# Patient Record
Sex: FEMALE | Race: WHITE
[De-identification: names, ages, dates, MRNs, and addresses within clinical notes are randomized per-mention and may not be internally consistent; named-entity substitution may affect disease eponyms.]

---

## 2017-05-10 ENCOUNTER — RX ONLY (RX ONLY)
Age: 42
End: 2017-05-10

## 2017-05-10 ENCOUNTER — OFFICE (OUTPATIENT)
Dept: URBAN - METROPOLITAN AREA CLINIC 89 | Facility: CLINIC | Age: 42
Setting detail: OPHTHALMOLOGY
End: 2017-05-10
Payer: COMMERCIAL

## 2017-05-10 DIAGNOSIS — H01.004: ICD-10-CM

## 2017-05-10 DIAGNOSIS — H01.001: ICD-10-CM

## 2017-05-10 DIAGNOSIS — H00.12: ICD-10-CM

## 2017-05-10 PROCEDURE — 92002 INTRM OPH EXAM NEW PATIENT: CPT | Performed by: OPHTHALMOLOGY

## 2017-05-10 ASSESSMENT — REFRACTION_MANIFEST
OS_VA3: 20/
OU_VA: 20/
OD_VA3: 20/
OS_VA1: 20/
OD_VA1: 20/
OS_VA1: 20/
OU_VA: 20/
OS_VA3: 20/
OS_VA2: 20/
OU_VA: 20/
OD_VA2: 20/
OS_VA2: 20/
OD_VA2: 20/
OS_VA3: 20/
OS_VA2: 20/
OD_VA3: 20/
OD_VA3: 20/
OD_VA2: 20/
OS_VA1: 20/

## 2017-05-10 ASSESSMENT — KERATOMETRY
OD_K2POWER_DIOPTERS: 45.25
OS_AXISANGLE_DEGREES: 080
OD_K1POWER_DIOPTERS: 44.50
OS_K1POWER_DIOPTERS: 44.25
OD_AXISANGLE_DEGREES: 081
OS_K2POWER_DIOPTERS: 44.75

## 2017-05-10 ASSESSMENT — REFRACTION_CURRENTRX
OS_OVR_VA: 20/
OS_OVR_VA: 20/
OD_OVR_VA: 20/
OS_OVR_VA: 20/

## 2017-05-10 ASSESSMENT — CONFRONTATIONAL VISUAL FIELD TEST (CVF)
OS_FINDINGS: FULL
OD_FINDINGS: FULL

## 2017-05-10 ASSESSMENT — LID EXAM ASSESSMENTS
OS_BLEPHARITIS: LLL LUL 2+
OD_BLEPHARITIS: RLL RUL 2+

## 2017-05-10 ASSESSMENT — REFRACTION_AUTOREFRACTION
OS_CYLINDER: -0.25
OS_SPHERE: -0.75
OD_AXIS: 0
OD_SPHERE: -0.75
OD_CYLINDER: SPHERE
OS_AXIS: 135

## 2017-05-10 ASSESSMENT — VISUAL ACUITY
OD_BCVA: 20/20
OS_BCVA: 20/20

## 2017-05-10 ASSESSMENT — SPHEQUIV_DERIVED: OS_SPHEQUIV: -0.875

## 2017-05-10 ASSESSMENT — AXIALLENGTH_DERIVED: OS_AL: 23.5655

## 2017-05-24 ENCOUNTER — OFFICE (OUTPATIENT)
Dept: URBAN - METROPOLITAN AREA CLINIC 89 | Facility: CLINIC | Age: 42
Setting detail: OPHTHALMOLOGY
End: 2017-05-24
Payer: COMMERCIAL

## 2017-05-24 ENCOUNTER — RX ONLY (RX ONLY)
Age: 42
End: 2017-05-24

## 2017-05-24 DIAGNOSIS — H01.004: ICD-10-CM

## 2017-05-24 DIAGNOSIS — H00.12: ICD-10-CM

## 2017-05-24 DIAGNOSIS — H01.001: ICD-10-CM

## 2017-05-24 PROCEDURE — 92012 INTRM OPH EXAM EST PATIENT: CPT | Performed by: OPHTHALMOLOGY

## 2017-05-24 ASSESSMENT — REFRACTION_AUTOREFRACTION
OS_CYLINDER: 0.00
OD_CYLINDER: -0.25
OS_AXIS: 0
OD_SPHERE: -0.50
OS_SPHERE: -0.25
OD_AXIS: 170

## 2017-05-24 ASSESSMENT — KERATOMETRY
OD_K1POWER_DIOPTERS: 44.25
OS_AXISANGLE_DEGREES: 90
OS_K1POWER_DIOPTERS: 44.25
OS_K2POWER_DIOPTERS: 45.00
OD_K2POWER_DIOPTERS: 45.00
OD_AXISANGLE_DEGREES: 82

## 2017-05-24 ASSESSMENT — REFRACTION_MANIFEST
OS_VA1: 20/
OS_VA3: 20/
OD_VA3: 20/
OD_VA2: 20/
OU_VA: 20/
OS_VA1: 20/
OS_VA2: 20/
OS_VA3: 20/
OD_VA1: 20/
OS_VA1: 20/
OU_VA: 20/
OS_VA2: 20/
OS_VA2: 20/
OS_VA3: 20/
OU_VA: 20/
OD_VA3: 20/
OD_VA1: 20/
OD_VA1: 20/
OD_VA2: 20/
OD_VA2: 20/
OD_VA3: 20/

## 2017-05-24 ASSESSMENT — LID EXAM ASSESSMENTS
OD_BLEPHARITIS: RLL RUL 2+
OS_BLEPHARITIS: LLL LUL 2+

## 2017-05-24 ASSESSMENT — VISUAL ACUITY
OD_BCVA: 20/20
OS_BCVA: 20/20

## 2017-05-24 ASSESSMENT — CONFRONTATIONAL VISUAL FIELD TEST (CVF)
OS_FINDINGS: FULL
OD_FINDINGS: FULL

## 2017-05-24 ASSESSMENT — REFRACTION_CURRENTRX
OD_OVR_VA: 20/
OD_OVR_VA: 20/
OS_OVR_VA: 20/
OD_OVR_VA: 20/

## 2017-05-24 ASSESSMENT — AXIALLENGTH_DERIVED
OS_AL: 23.2803
OD_AL: 23.4234

## 2017-05-24 ASSESSMENT — SPHEQUIV_DERIVED
OS_SPHEQUIV: -0.25
OD_SPHEQUIV: -0.625

## 2019-04-24 ENCOUNTER — OFFICE (OUTPATIENT)
Dept: URBAN - METROPOLITAN AREA CLINIC 102 | Facility: CLINIC | Age: 44
Setting detail: OPHTHALMOLOGY
End: 2019-04-24
Payer: COMMERCIAL

## 2019-04-24 DIAGNOSIS — H11.31: ICD-10-CM

## 2019-04-24 PROCEDURE — 92012 INTRM OPH EXAM EST PATIENT: CPT | Performed by: OPHTHALMOLOGY

## 2019-04-24 ASSESSMENT — KERATOMETRY
OS_K2POWER_DIOPTERS: 45.00
OS_AXISANGLE_DEGREES: 093
OS_K1POWER_DIOPTERS: 44.25
OD_AXISANGLE_DEGREES: 083
OD_K2POWER_DIOPTERS: 45.50
OD_K1POWER_DIOPTERS: 44.50

## 2019-04-24 ASSESSMENT — LID EXAM ASSESSMENTS
OD_BLEPHARITIS: RLL RUL 2+
OS_BLEPHARITIS: LLL LUL 2+

## 2019-04-24 ASSESSMENT — REFRACTION_CURRENTRX
OD_OVR_VA: 20/
OS_OVR_VA: 20/
OS_OVR_VA: 20/
OD_OVR_VA: 20/
OD_OVR_VA: 20/
OS_OVR_VA: 20/

## 2019-04-24 ASSESSMENT — VISUAL ACUITY
OS_BCVA: 20/20
OD_BCVA: 20/20

## 2019-04-24 ASSESSMENT — REFRACTION_AUTOREFRACTION
OD_AXIS: 158
OS_AXIS: 007
OD_CYLINDER: -0.25
OS_SPHERE: PLANO
OD_SPHERE: PLANO
OS_CYLINDER: -0.25

## 2019-04-24 ASSESSMENT — REFRACTION_MANIFEST
OD_VA1: 20/
OS_VA3: 20/
OS_VA2: 20/
OD_VA3: 20/
OD_VA2: 20/
OD_VA1: 20/
OU_VA: 20/
OU_VA: 20/
OD_VA2: 20/
OS_VA2: 20/
OS_VA1: 20/
OS_VA1: 20/
OD_VA3: 20/
OS_VA3: 20/

## 2019-04-24 ASSESSMENT — CONFRONTATIONAL VISUAL FIELD TEST (CVF)
OS_FINDINGS: FULL
OD_FINDINGS: FULL

## 2019-05-08 ENCOUNTER — OFFICE (OUTPATIENT)
Dept: URBAN - METROPOLITAN AREA CLINIC 102 | Facility: CLINIC | Age: 44
Setting detail: OPHTHALMOLOGY
End: 2019-05-08
Payer: COMMERCIAL

## 2019-05-08 DIAGNOSIS — H01.001: ICD-10-CM

## 2019-05-08 DIAGNOSIS — H16.223: ICD-10-CM

## 2019-05-08 DIAGNOSIS — H11.31: ICD-10-CM

## 2019-05-08 DIAGNOSIS — H01.005: ICD-10-CM

## 2019-05-08 DIAGNOSIS — H01.002: ICD-10-CM

## 2019-05-08 DIAGNOSIS — H01.004: ICD-10-CM

## 2019-05-08 DIAGNOSIS — H00.12: ICD-10-CM

## 2019-05-08 PROCEDURE — 92012 INTRM OPH EXAM EST PATIENT: CPT | Performed by: OPHTHALMOLOGY

## 2019-05-08 ASSESSMENT — REFRACTION_CURRENTRX
OS_OVR_VA: 20/
OD_OVR_VA: 20/
OD_OVR_VA: 20/
OS_OVR_VA: 20/
OD_OVR_VA: 20/
OS_OVR_VA: 20/

## 2019-05-08 ASSESSMENT — KERATOMETRY
OS_K2POWER_DIOPTERS: 47.00
OD_AXISANGLE_DEGREES: 86
OD_K2POWER_DIOPTERS: 45.00
OD_K1POWER_DIOPTERS: 44.50
OS_AXISANGLE_DEGREES: 80
OS_K1POWER_DIOPTERS: 45.75

## 2019-05-08 ASSESSMENT — REFRACTION_MANIFEST
OS_VA3: 20/
OD_VA1: 20/
OD_VA1: 20/
OD_VA2: 20/
OS_VA1: 20/
OS_VA2: 20/
OS_VA2: 20/
OS_VA3: 20/
OS_VA1: 20/
OD_VA3: 20/
OD_VA2: 20/
OU_VA: 20/
OU_VA: 20/
OD_VA3: 20/

## 2019-05-08 ASSESSMENT — LID EXAM ASSESSMENTS
OS_BLEPHARITIS: LLL LUL 2+
OD_BLEPHARITIS: RLL RUL 2+

## 2019-05-08 ASSESSMENT — DRY EYES - PHYSICIAN NOTES
OS_GENERALCOMMENTS: DRY
OD_GENERALCOMMENTS: DRY

## 2019-05-08 ASSESSMENT — REFRACTION_AUTOREFRACTION
OD_SPHERE: PLANO
OD_CYLINDER: SPH
OS_CYLINDER: -0.25
OS_AXIS: 030
OS_SPHERE: PLANO

## 2019-05-08 ASSESSMENT — CONFRONTATIONAL VISUAL FIELD TEST (CVF)
OS_FINDINGS: FULL
OD_FINDINGS: FULL

## 2019-05-08 ASSESSMENT — VISUAL ACUITY
OD_BCVA: 20/20
OS_BCVA: 20/20

## 2022-02-14 ENCOUNTER — RX ONLY (RX ONLY)
Age: 47
End: 2022-02-14

## 2022-02-14 ENCOUNTER — OFFICE (OUTPATIENT)
Dept: URBAN - METROPOLITAN AREA CLINIC 35 | Facility: CLINIC | Age: 47
Setting detail: OPHTHALMOLOGY
End: 2022-02-14
Payer: MEDICAID

## 2022-02-14 DIAGNOSIS — H11.153: ICD-10-CM

## 2022-02-14 DIAGNOSIS — H05.20: ICD-10-CM

## 2022-02-14 PROCEDURE — 92002 INTRM OPH EXAM NEW PATIENT: CPT | Performed by: OPHTHALMOLOGY

## 2022-02-14 ASSESSMENT — AXIALLENGTH_DERIVED
OS_AL: 23.5919
OD_AL: 23.5516

## 2022-02-14 ASSESSMENT — TONOMETRY
OD_IOP_MMHG: 20
OS_IOP_MMHG: 20

## 2022-02-14 ASSESSMENT — REFRACTION_AUTOREFRACTION
OD_CYLINDER: +0.50
OS_AXIS: 074
OS_SPHERE: -0.25
OD_SPHERE: -0.50
OD_AXIS: 101
OS_CYLINDER: +0.50

## 2022-02-14 ASSESSMENT — SPHEQUIV_DERIVED
OS_SPHEQUIV: 0
OD_SPHEQUIV: -0.25

## 2022-02-14 ASSESSMENT — KERATOMETRY
OD_K1POWER_DIOPTERS: 43.25
OS_K1POWER_DIOPTERS: 43.00
OS_K2POWER_DIOPTERS: 44.00
OD_AXISANGLE_DEGREES: 082
OS_AXISANGLE_DEGREES: 097
OD_K2POWER_DIOPTERS: 44.50

## 2022-02-14 ASSESSMENT — REFRACTION_MANIFEST
OD_ADD: +1.00
OS_ADD: +1.00

## 2022-02-14 ASSESSMENT — VISUAL ACUITY
OS_BCVA: 20/20
OD_BCVA: 20/20

## 2022-03-06 ENCOUNTER — OFFICE (OUTPATIENT)
Dept: URBAN - METROPOLITAN AREA CLINIC 35 | Facility: CLINIC | Age: 47
Setting detail: OPHTHALMOLOGY
End: 2022-03-06
Payer: MEDICAID

## 2022-03-06 DIAGNOSIS — E05.00: ICD-10-CM

## 2022-03-06 DIAGNOSIS — H05.20: ICD-10-CM

## 2022-03-06 DIAGNOSIS — H11.153: ICD-10-CM

## 2022-03-06 PROCEDURE — 92012 INTRM OPH EXAM EST PATIENT: CPT | Performed by: OPHTHALMOLOGY

## 2022-03-06 PROCEDURE — 92285 EXTERNAL OCULAR PHOTOGRAPHY: CPT | Performed by: OPHTHALMOLOGY

## 2022-03-06 ASSESSMENT — VISUAL ACUITY
OS_BCVA: 20/20
OD_BCVA: 20/20

## 2022-03-06 ASSESSMENT — AXIALLENGTH_DERIVED
OD_AL: 23.5516
OS_AL: 23.5919

## 2022-03-06 ASSESSMENT — REFRACTION_AUTOREFRACTION
OD_SPHERE: -0.50
OS_AXIS: 074
OD_AXIS: 101
OD_CYLINDER: +0.50
OS_SPHERE: -0.25
OS_CYLINDER: +0.50

## 2022-03-06 ASSESSMENT — CONFRONTATIONAL VISUAL FIELD TEST (CVF)
OS_FINDINGS: FULL
OD_FINDINGS: FULL

## 2022-03-06 ASSESSMENT — KERATOMETRY
OD_K1POWER_DIOPTERS: 43.25
OD_K2POWER_DIOPTERS: 44.50
OD_AXISANGLE_DEGREES: 082
OS_K1POWER_DIOPTERS: 43.00
OS_AXISANGLE_DEGREES: 097
OS_K2POWER_DIOPTERS: 44.00

## 2022-03-06 ASSESSMENT — SPHEQUIV_DERIVED
OD_SPHEQUIV: -0.25
OS_SPHEQUIV: 0

## 2022-05-01 ENCOUNTER — OFFICE (OUTPATIENT)
Dept: URBAN - METROPOLITAN AREA CLINIC 35 | Facility: CLINIC | Age: 47
Setting detail: OPHTHALMOLOGY
End: 2022-05-01
Payer: MEDICAID

## 2022-05-01 DIAGNOSIS — H11.153: ICD-10-CM

## 2022-05-01 DIAGNOSIS — E05.00: ICD-10-CM

## 2022-05-01 DIAGNOSIS — H05.20: ICD-10-CM

## 2022-05-01 PROBLEM — H52.7 REFRACTIVE ERROR: Status: ACTIVE | Noted: 2022-02-14

## 2022-05-01 PROCEDURE — 92012 INTRM OPH EXAM EST PATIENT: CPT | Performed by: OPHTHALMOLOGY

## 2022-05-01 ASSESSMENT — REFRACTION_AUTOREFRACTION
OS_CYLINDER: +0.50
OS_AXIS: 074
OD_SPHERE: -0.50
OD_AXIS: 101
OD_CYLINDER: +0.50
OS_SPHERE: -0.25

## 2022-05-01 ASSESSMENT — CONFRONTATIONAL VISUAL FIELD TEST (CVF)
OD_FINDINGS: FULL
OS_FINDINGS: FULL

## 2022-05-01 ASSESSMENT — SPHEQUIV_DERIVED
OD_SPHEQUIV: -0.25
OS_SPHEQUIV: 0

## 2022-05-01 ASSESSMENT — KERATOMETRY
OS_AXISANGLE_DEGREES: 097
OS_K1POWER_DIOPTERS: 43.00
OD_K2POWER_DIOPTERS: 44.50
OD_AXISANGLE_DEGREES: 082
OD_K1POWER_DIOPTERS: 43.25
OS_K2POWER_DIOPTERS: 44.00

## 2022-05-01 ASSESSMENT — AXIALLENGTH_DERIVED
OS_AL: 23.5919
OD_AL: 23.5516

## 2022-05-01 ASSESSMENT — VISUAL ACUITY
OS_BCVA: 20/20-
OD_BCVA: 20/25+

## 2022-06-17 ENCOUNTER — OFFICE (OUTPATIENT)
Dept: URBAN - METROPOLITAN AREA CLINIC 35 | Facility: CLINIC | Age: 47
Setting detail: OPHTHALMOLOGY
End: 2022-06-17
Payer: MEDICAID

## 2022-06-17 DIAGNOSIS — H05.20: ICD-10-CM
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
## 2022-06-17 DIAGNOSIS — H11.153: ICD-10-CM

## 2022-06-17 DIAGNOSIS — E05.00: ICD-10-CM

## 2022-06-17 DIAGNOSIS — H02.402: ICD-10-CM

## 2022-06-17 PROCEDURE — 92014 COMPRE OPH EXAM EST PT 1/>: CPT | Performed by: OPHTHALMOLOGY

## 2022-06-17 PROCEDURE — 10004 FNA BX W/O IMG GDN EA ADDL: CPT | Performed by: OPHTHALMOLOGY

## 2022-06-17 ASSESSMENT — AXIALLENGTH_DERIVED
OD_AL: 23.5516
OS_AL: 23.5919

## 2022-06-17 ASSESSMENT — VISUAL ACUITY
OD_BCVA: 20/25+1
OS_BCVA: 20/20-

## 2022-06-17 ASSESSMENT — CONFRONTATIONAL VISUAL FIELD TEST (CVF)
OS_FINDINGS: FULL
OD_FINDINGS: FULL

## 2022-06-17 ASSESSMENT — REFRACTION_AUTOREFRACTION
OS_CYLINDER: +0.50
OD_CYLINDER: +0.50
OD_AXIS: 101
OD_SPHERE: -0.50
OS_SPHERE: -0.25
OS_AXIS: 074

## 2022-06-17 ASSESSMENT — KERATOMETRY
OD_K2POWER_DIOPTERS: 44.50
OS_K2POWER_DIOPTERS: 44.00
OS_AXISANGLE_DEGREES: 097
OS_K1POWER_DIOPTERS: 43.00
OD_K1POWER_DIOPTERS: 43.25
OD_AXISANGLE_DEGREES: 082

## 2022-06-17 ASSESSMENT — SPHEQUIV_DERIVED
OS_SPHEQUIV: 0
OD_SPHEQUIV: -0.25

## 2022-06-17 ASSESSMENT — LID POSITION - PTOSIS: OS_PTOSIS: T

## 2023-04-17 PROBLEM — Z00.00 ENCOUNTER FOR PREVENTIVE HEALTH EXAMINATION: Status: ACTIVE | Noted: 2023-04-17

## 2023-05-25 ENCOUNTER — APPOINTMENT (OUTPATIENT)
Dept: CARDIOLOGY | Facility: CLINIC | Age: 48
End: 2023-05-25
Payer: MEDICAID

## 2023-05-25 ENCOUNTER — NON-APPOINTMENT (OUTPATIENT)
Age: 48
End: 2023-05-25

## 2023-05-25 VITALS
HEART RATE: 74 BPM | BODY MASS INDEX: 21.79 KG/M2 | SYSTOLIC BLOOD PRESSURE: 110 MMHG | WEIGHT: 123 LBS | DIASTOLIC BLOOD PRESSURE: 60 MMHG | HEIGHT: 63 IN | OXYGEN SATURATION: 98 %

## 2023-05-25 DIAGNOSIS — R06.00 DYSPNEA, UNSPECIFIED: ICD-10-CM

## 2023-05-25 DIAGNOSIS — E78.5 HYPERLIPIDEMIA, UNSPECIFIED: ICD-10-CM

## 2023-05-25 DIAGNOSIS — R07.89 OTHER CHEST PAIN: ICD-10-CM

## 2023-05-25 DIAGNOSIS — R94.31 ABNORMAL ELECTROCARDIOGRAM [ECG] [EKG]: ICD-10-CM

## 2023-05-25 PROCEDURE — 93000 ELECTROCARDIOGRAM COMPLETE: CPT

## 2023-05-25 PROCEDURE — 99204 OFFICE O/P NEW MOD 45 MIN: CPT | Mod: 25

## 2023-05-25 NOTE — DISCUSSION/SUMMARY
[FreeTextEntry1] : 48-year-old female history of hyperlipidemia, atypical chest discomfort.  Abnormal EKG.  Further risk stratification with transthoracic echocardiogram to assess left ventricular function and rule out pulmonary hypertension, also exercise nuclear stress test given EKG abnormalities to assess for occult ischemia.  If the cardiac evaluation reveals no evidence of cardiac pathology, suggest pulmonary evaluation for COPD.  There is a possibility that some of the complaints may be musculoskeletal due to her occupation and she should be evaluated by occupational specialist.\par \par Patient noncompliant with her statin because she frequently forgets to take in the evening; told to take it in the morning daily. [EKG obtained to assist in diagnosis and management of assessed problem(s)] : EKG obtained to assist in diagnosis and management of assessed problem(s)

## 2023-05-25 NOTE — HISTORY OF PRESENT ILLNESS
[FreeTextEntry1] : 48-year-old female referred for evaluation of left-sided chest wall discomfort.  As per the patient she has had intermittent chest pains on the left for over 2 years.  The pain is confined mostly above the left breast sometimes she feels a sticking sensation in her throat that prevents her from deep breathing.  She states she gets extremely tired when she does a moderate amount of exercise although she says she does some mild exercise without any difficulties.  The patient did state that her pains were worse on the weekends.  She works in a bakery lifting heavy pans for living.  Pain is unassociated with diaphoresis, palpitations or syncopal episodes.\par \par Non-smoker.  Was a drinker until several years ago.  Being treated for hyperlipidemia but is taking her medications inconsistently.  Denies any prior history of hypertension or hyperlipidemia but no labs are available for review at the time of this evaluation.\par \par Single with 4 children.

## 2023-05-25 NOTE — CARDIOLOGY SUMMARY
[de-identified] : 5/25/23, Sinus  Rhythm  -Short MA syndrome \par -Poor R-wave progression -nonspecific -consider old anterior infarct.

## 2023-06-19 ENCOUNTER — APPOINTMENT (OUTPATIENT)
Dept: CARDIOLOGY | Facility: CLINIC | Age: 48
End: 2023-06-19

## 2023-06-20 ENCOUNTER — APPOINTMENT (OUTPATIENT)
Dept: CARDIOLOGY | Facility: CLINIC | Age: 48
End: 2023-06-20